# Patient Record
Sex: MALE | Race: WHITE | Employment: UNEMPLOYED | URBAN - METROPOLITAN AREA
[De-identification: names, ages, dates, MRNs, and addresses within clinical notes are randomized per-mention and may not be internally consistent; named-entity substitution may affect disease eponyms.]

---

## 2020-06-08 ENCOUNTER — APPOINTMENT (OUTPATIENT)
Dept: GENERAL RADIOLOGY | Age: 45
End: 2020-06-08
Payer: COMMERCIAL

## 2020-06-08 ENCOUNTER — HOSPITAL ENCOUNTER (EMERGENCY)
Age: 45
Discharge: HOME | End: 2020-06-08
Attending: EMERGENCY MEDICINE
Payer: COMMERCIAL

## 2020-06-08 VITALS
BODY MASS INDEX: 24.92 KG/M2 | HEART RATE: 114 BPM | OXYGEN SATURATION: 99 % | DIASTOLIC BLOOD PRESSURE: 84 MMHG | SYSTOLIC BLOOD PRESSURE: 143 MMHG | TEMPERATURE: 97.5 F | HEIGHT: 71 IN | WEIGHT: 178 LBS | RESPIRATION RATE: 18 BRPM

## 2020-06-08 PROCEDURE — 90471 IMMUNIZATION ADMIN: CPT | Performed by: EMERGENCY MEDICINE

## 2020-06-08 PROCEDURE — 99283 EMERGENCY DEPT VISIT LOW MDM: CPT

## 2020-06-08 PROCEDURE — 6370000000 HC RX 637 (ALT 250 FOR IP): Performed by: EMERGENCY MEDICINE

## 2020-06-08 PROCEDURE — 90715 TDAP VACCINE 7 YRS/> IM: CPT | Performed by: EMERGENCY MEDICINE

## 2020-06-08 PROCEDURE — 73130 X-RAY EXAM OF HAND: CPT

## 2020-06-08 PROCEDURE — 6360000002 HC RX W HCPCS: Performed by: EMERGENCY MEDICINE

## 2020-06-08 RX ORDER — CLINDAMYCIN PHOSPHATE 10 UG/ML
LOTION TOPICAL
COMMUNITY
Start: 2015-05-26

## 2020-06-08 RX ORDER — IBUPROFEN 600 MG/1
600 TABLET ORAL ONCE
Status: COMPLETED | OUTPATIENT
Start: 2020-06-08 | End: 2020-06-08

## 2020-06-08 RX ORDER — IBUPROFEN 600 MG/1
600 TABLET ORAL EVERY 8 HOURS PRN
Qty: 30 TABLET | Refills: 0 | Status: SHIPPED | OUTPATIENT
Start: 2020-06-08

## 2020-06-08 RX ORDER — DIAPER,BRIEF,INFANT-TODD,DISP
EACH MISCELLANEOUS 2 TIMES DAILY
Status: DISCONTINUED | OUTPATIENT
Start: 2020-06-08 | End: 2020-06-08 | Stop reason: HOSPADM

## 2020-06-08 RX ADMIN — TETANUS TOXOID, REDUCED DIPHTHERIA TOXOID AND ACELLULAR PERTUSSIS VACCINE, ADSORBED 0.5 ML: 5; 2.5; 8; 8; 2.5 SUSPENSION INTRAMUSCULAR at 16:31

## 2020-06-08 RX ADMIN — IBUPROFEN 600 MG: 600 TABLET, FILM COATED ORAL at 16:31

## 2020-06-08 ASSESSMENT — ENCOUNTER SYMPTOMS
VOMITING: 0
DIARRHEA: 0
SORE THROAT: 0
FACIAL SWELLING: 0
TROUBLE SWALLOWING: 0
SHORTNESS OF BREATH: 0
WHEEZING: 0
CHEST TIGHTNESS: 0
SINUS PRESSURE: 0
VOICE CHANGE: 0
CONSTIPATION: 0
BLOOD IN STOOL: 0
COUGH: 0
BACK PAIN: 0
EYE REDNESS: 0
ABDOMINAL PAIN: 0
EYE DISCHARGE: 0
CHOKING: 0
EYE PAIN: 0
STRIDOR: 0

## 2020-06-08 ASSESSMENT — PAIN DESCRIPTION - ORIENTATION: ORIENTATION: RIGHT

## 2020-06-08 ASSESSMENT — PAIN DESCRIPTION - FREQUENCY: FREQUENCY: CONTINUOUS

## 2020-06-08 ASSESSMENT — PAIN SCALES - GENERAL
PAINLEVEL_OUTOF10: 6
PAINLEVEL_OUTOF10: 6

## 2020-06-08 ASSESSMENT — PAIN DESCRIPTION - PAIN TYPE: TYPE: ACUTE PAIN

## 2020-06-08 ASSESSMENT — PAIN DESCRIPTION - LOCATION: LOCATION: HAND

## 2020-06-08 ASSESSMENT — PAIN DESCRIPTION - PROGRESSION: CLINICAL_PROGRESSION: GRADUALLY WORSENING

## 2020-06-08 ASSESSMENT — PAIN DESCRIPTION - ONSET: ONSET: ON-GOING

## 2020-06-08 NOTE — ED PROVIDER NOTES
seizures, syncope, weakness, numbness and headaches. Hematological: Negative for adenopathy. Does not bruise/bleed easily. Psychiatric/Behavioral: Negative for agitation, behavioral problems, hallucinations and sleep disturbance. The patient is not hyperactive. All other systems reviewed and are negative. Except as noted above the remainder of the review of systems was reviewed and negative. PAST MEDICAL HISTORY     Past Medical History:   Diagnosis Date    H/O testicular cancer left         SURGICALHISTORY       Past Surgical History:   Procedure Laterality Date    APPENDECTOMY      CYST REMOVAL Left 1970    left wrist    LAPAROSCOPIC APPENDECTOMY  10/21/14    OTHER SURGICAL HISTORY      port removal    TESTICLE REMOVAL Left     TESTICLE SURGERY Right     TONSILLECTOMY      TUNNELED VENOUS PORT PLACEMENT           CURRENT MEDICATIONS       Previous Medications    CLINDAMYCIN (CLEOCIN T) 1 % LOTION    Clindamycin Clindamycin Phosphate 1 % External Lotion APPLY SPARINGLY AND MASSAGE IN TWICE DAILY. Quantity: 1 Refills: 6 Terrence Torres DO Start : 31-Mar-2016 Active 60 ML Bottle 03- [facility] ([id number])    SULFAMETHOXAZOLE-TRIMETHOPRIM (BACTRIM DS) 800-160 MG PER TABLET           ALLERGIES     Bleomycin and Codeine    FAMILY HISTORY     History reviewed. No pertinent family history.        SOCIAL HISTORY       Social History     Socioeconomic History    Marital status: Single     Spouse name: None    Number of children: None    Years of education: None    Highest education level: None   Occupational History    None   Social Needs    Financial resource strain: None    Food insecurity     Worry: None     Inability: None    Transportation needs     Medical: None     Non-medical: None   Tobacco Use    Smoking status: Never Smoker    Smokeless tobacco: Never Used   Substance and Sexual Activity    Alcohol use: Not Currently    Drug use: Never    Sexual activity: None   Lifestyle    Physical activity     Days per week: None     Minutes per session: None    Stress: None   Relationships    Social connections     Talks on phone: None     Gets together: None     Attends Adventism service: None     Active member of club or organization: None     Attends meetings of clubs or organizations: None     Relationship status: None    Intimate partner violence     Fear of current or ex partner: None     Emotionally abused: None     Physically abused: None     Forced sexual activity: None   Other Topics Concern    None   Social History Narrative    None       SCREENINGS      @FLOW([id number])@      PHYSICAL EXAM    (up to 7 for level 4, 8 or more for level 5)     ED Triage Vitals [06/08/20 1549]   BP Temp Temp Source Pulse Resp SpO2 Height Weight   (!) 143/84 97.5 °F (36.4 °C) Temporal 114 18 99 % 5' 11\" (1.803 m) 178 lb (80.7 kg)       Physical Exam  Vitals signs and nursing note reviewed. Constitutional:       Appearance: Normal appearance. He is well-developed and normal weight. Comments: Active alert cooperative patient slightly anxious at this time   HENT:      Head: Normocephalic. Right Ear: Tympanic membrane, ear canal and external ear normal.      Left Ear: Tympanic membrane, ear canal and external ear normal.      Nose: No congestion or rhinorrhea. Mouth/Throat:      Pharynx: No oropharyngeal exudate or posterior oropharyngeal erythema. Eyes:      General:         Right eye: No discharge. Left eye: No discharge. Extraocular Movements: Extraocular movements intact. Neck:      Musculoskeletal: Normal range of motion and neck supple. Cardiovascular:      Rate and Rhythm: Normal rate. Heart sounds: Normal heart sounds. No murmur. No gallop. Pulmonary:      Effort: No respiratory distress. Breath sounds: Normal breath sounds. No wheezing. Abdominal:      General: Bowel sounds are normal.      Palpations: Abdomen is soft. (!) 143/84   Pulse: 114   Resp: 18   Temp: 97.5 °F (36.4 °C)   TempSrc: Temporal   SpO2: 99%   Weight: 178 lb (80.7 kg)   Height: 5' 11\" (1.803 m)           MDM    CRITICAL CARE TIME   Total Critical Care time was  minutes, excluding separately reportableprocedures. There was a high probability of clinicallysignificant/life threatening deterioration in the patient's condition which required my urgent intervention. ONSULTS:  None    PROCEDURES:  Unless otherwise noted below, none     Procedures    FINAL IMPRESSION      1. Contusion of right hand, initial encounter    2. Crush injury          DISPOSITION/PLAN   DISPOSITION        PATIENT REFERRED TO:  No follow-up provider specified.     DISCHARGE MEDICATIONS:  New Prescriptions    No medications on file          (Please note that portions of this note were completed with a voice recognition program.  Efforts were made to edit the dictations but occasionally words are mis-transcribed.)    Valorie Reynolds MD (electronically signed)  Attending Emergency Physician       Valorie Reynolds MD  06/08/20 8448

## 2022-05-25 ENCOUNTER — HOSPITAL ENCOUNTER (EMERGENCY)
Age: 47
Discharge: HOME | End: 2022-05-25
Attending: STUDENT IN AN ORGANIZED HEALTH CARE EDUCATION/TRAINING PROGRAM

## 2022-05-25 ENCOUNTER — APPOINTMENT (OUTPATIENT)
Dept: CT IMAGING | Age: 47
End: 2022-05-25

## 2022-05-25 VITALS
RESPIRATION RATE: 12 BRPM | HEART RATE: 87 BPM | BODY MASS INDEX: 25.8 KG/M2 | SYSTOLIC BLOOD PRESSURE: 143 MMHG | WEIGHT: 185 LBS | TEMPERATURE: 98.7 F | DIASTOLIC BLOOD PRESSURE: 98 MMHG | OXYGEN SATURATION: 96 %

## 2022-05-25 DIAGNOSIS — R20.0 NUMBNESS: ICD-10-CM

## 2022-05-25 DIAGNOSIS — R20.2 PARESTHESIAS: Primary | ICD-10-CM

## 2022-05-25 LAB
ALBUMIN SERPL-MCNC: 4.3 G/DL (ref 3.5–4.6)
ALP BLD-CCNC: 67 U/L (ref 35–104)
ALT SERPL-CCNC: 20 U/L (ref 0–41)
ANION GAP SERPL CALCULATED.3IONS-SCNC: 13 MEQ/L (ref 9–15)
APTT: 27.8 SEC (ref 24.4–36.8)
AST SERPL-CCNC: 19 U/L (ref 0–40)
BASOPHILS ABSOLUTE: 0 K/UL (ref 0–0.2)
BASOPHILS RELATIVE PERCENT: 0.4 %
BILIRUB SERPL-MCNC: <0.2 MG/DL (ref 0.2–0.7)
BUN BLDV-MCNC: 19 MG/DL (ref 6–20)
CALCIUM SERPL-MCNC: 9.3 MG/DL (ref 8.5–9.9)
CHLORIDE BLD-SCNC: 104 MEQ/L (ref 95–107)
CHOLESTEROL, TOTAL: 197 MG/DL (ref 0–199)
CO2: 23 MEQ/L (ref 20–31)
CREAT SERPL-MCNC: 0.9 MG/DL (ref 0.7–1.2)
EKG ATRIAL RATE: 93 BPM
EKG P AXIS: 51 DEGREES
EKG P-R INTERVAL: 172 MS
EKG Q-T INTERVAL: 430 MS
EKG QRS DURATION: 96 MS
EKG QTC CALCULATION (BAZETT): 534 MS
EKG R AXIS: 11 DEGREES
EKG T AXIS: 35 DEGREES
EKG VENTRICULAR RATE: 93 BPM
EOSINOPHILS ABSOLUTE: 0 K/UL (ref 0–0.7)
EOSINOPHILS RELATIVE PERCENT: 0.7 %
GFR AFRICAN AMERICAN: >60
GFR NON-AFRICAN AMERICAN: >60
GLOBULIN: 2.4 G/DL (ref 2.3–3.5)
GLUCOSE BLD-MCNC: 112 MG/DL (ref 70–99)
HCT VFR BLD CALC: 43.4 % (ref 42–52)
HDLC SERPL-MCNC: 42 MG/DL (ref 40–59)
HEMOGLOBIN: 14.7 G/DL (ref 14–18)
INR BLD: 0.9
LDL CHOLESTEROL CALCULATED: 133 MG/DL (ref 0–129)
LYMPHOCYTES ABSOLUTE: 1.5 K/UL (ref 1–4.8)
LYMPHOCYTES RELATIVE PERCENT: 29.1 %
MAGNESIUM: 2.2 MG/DL (ref 1.7–2.4)
MCH RBC QN AUTO: 29.9 PG (ref 27–31.3)
MCHC RBC AUTO-ENTMCNC: 33.8 % (ref 33–37)
MCV RBC AUTO: 88.5 FL (ref 80–100)
MONOCYTES ABSOLUTE: 0.5 K/UL (ref 0.2–0.8)
MONOCYTES RELATIVE PERCENT: 9.4 %
NEUTROPHILS ABSOLUTE: 3.2 K/UL (ref 1.4–6.5)
NEUTROPHILS RELATIVE PERCENT: 60.4 %
PDW BLD-RTO: 13.2 % (ref 11.5–14.5)
PLATELET # BLD: 237 K/UL (ref 130–400)
POC CREATININE WHOLE BLOOD: 0.9
POTASSIUM SERPL-SCNC: 3.9 MEQ/L (ref 3.4–4.9)
PROTHROMBIN TIME: 11.9 SEC (ref 12.3–14.9)
RBC # BLD: 4.9 M/UL (ref 4.7–6.1)
SODIUM BLD-SCNC: 140 MEQ/L (ref 135–144)
TOTAL CK: 147 U/L (ref 0–190)
TOTAL PROTEIN: 6.7 G/DL (ref 6.3–8)
TRIGL SERPL-MCNC: 109 MG/DL (ref 0–150)
TROPONIN: <0.01 NG/ML (ref 0–0.01)
WBC # BLD: 5.3 K/UL (ref 4.8–10.8)

## 2022-05-25 PROCEDURE — 70496 CT ANGIOGRAPHY HEAD: CPT

## 2022-05-25 PROCEDURE — 82550 ASSAY OF CK (CPK): CPT

## 2022-05-25 PROCEDURE — 85610 PROTHROMBIN TIME: CPT

## 2022-05-25 PROCEDURE — 99285 EMERGENCY DEPT VISIT HI MDM: CPT

## 2022-05-25 PROCEDURE — 6360000004 HC RX CONTRAST MEDICATION: Performed by: STUDENT IN AN ORGANIZED HEALTH CARE EDUCATION/TRAINING PROGRAM

## 2022-05-25 PROCEDURE — 83735 ASSAY OF MAGNESIUM: CPT

## 2022-05-25 PROCEDURE — 6360000002 HC RX W HCPCS: Performed by: STUDENT IN AN ORGANIZED HEALTH CARE EDUCATION/TRAINING PROGRAM

## 2022-05-25 PROCEDURE — 2580000003 HC RX 258: Performed by: STUDENT IN AN ORGANIZED HEALTH CARE EDUCATION/TRAINING PROGRAM

## 2022-05-25 PROCEDURE — 70450 CT HEAD/BRAIN W/O DYE: CPT

## 2022-05-25 PROCEDURE — 96365 THER/PROPH/DIAG IV INF INIT: CPT

## 2022-05-25 PROCEDURE — 36415 COLL VENOUS BLD VENIPUNCTURE: CPT

## 2022-05-25 PROCEDURE — 70498 CT ANGIOGRAPHY NECK: CPT

## 2022-05-25 PROCEDURE — 85025 COMPLETE CBC W/AUTO DIFF WBC: CPT

## 2022-05-25 PROCEDURE — 80053 COMPREHEN METABOLIC PANEL: CPT

## 2022-05-25 PROCEDURE — 85730 THROMBOPLASTIN TIME PARTIAL: CPT

## 2022-05-25 PROCEDURE — 80061 LIPID PANEL: CPT

## 2022-05-25 PROCEDURE — 84484 ASSAY OF TROPONIN QUANT: CPT

## 2022-05-25 PROCEDURE — 93005 ELECTROCARDIOGRAM TRACING: CPT | Performed by: STUDENT IN AN ORGANIZED HEALTH CARE EDUCATION/TRAINING PROGRAM

## 2022-05-25 RX ORDER — 0.9 % SODIUM CHLORIDE 0.9 %
1000 INTRAVENOUS SOLUTION INTRAVENOUS ONCE
Status: COMPLETED | OUTPATIENT
Start: 2022-05-25 | End: 2022-05-25

## 2022-05-25 RX ORDER — HYDROXYZINE HYDROCHLORIDE 25 MG/1
50 TABLET, FILM COATED ORAL ONCE
Status: DISCONTINUED | OUTPATIENT
Start: 2022-05-25 | End: 2022-05-25 | Stop reason: HOSPADM

## 2022-05-25 RX ORDER — MAGNESIUM SULFATE IN WATER 40 MG/ML
2000 INJECTION, SOLUTION INTRAVENOUS ONCE
Status: COMPLETED | OUTPATIENT
Start: 2022-05-25 | End: 2022-05-25

## 2022-05-25 RX ADMIN — SODIUM CHLORIDE 1000 ML: 9 INJECTION, SOLUTION INTRAVENOUS at 06:17

## 2022-05-25 RX ADMIN — MAGNESIUM SULFATE HEPTAHYDRATE 2000 MG: 40 INJECTION, SOLUTION INTRAVENOUS at 07:08

## 2022-05-25 RX ADMIN — IOPAMIDOL 100 ML: 612 INJECTION, SOLUTION INTRAVENOUS at 06:52

## 2022-05-25 ASSESSMENT — ENCOUNTER SYMPTOMS
PHOTOPHOBIA: 0
VOMITING: 0
NAUSEA: 0
ABDOMINAL PAIN: 0
SHORTNESS OF BREATH: 0
RHINORRHEA: 0
SORE THROAT: 0
DIARRHEA: 0
BACK PAIN: 0
COUGH: 0
EYE PAIN: 0

## 2022-05-25 NOTE — ED PROVIDER NOTES
[address] [facility] ED  eMERGENCY dEPARTMENT eNCOUnter      Pt Name: Jaky Chirinos  MRN: [id number]  [place] 1975  Date of evaluation: 5/25/2022  Provider: Cherelle Morrison MD      HISTORY OF PRESENT ILLNESS      Chief Complaint   Patient presents with    Numbness     pt c/o numbness on right side of arm, face and foot. for 1 hour. pt states symptoms are going away now       The history is provided by the Patient. Jaky Chirinos is a 55 y.o. male with a PMH clinically significant for GERD,  hiatal hernia and remote hx of testicular CA s/p resection and chemo presenting to the ED via PV c/o numbness and tingling to the right sided face, right forearm and right ankle and foot with initial onset at approximately 12 AM this morning. States he was recently evaluated by his PCP due to areas of swelling, pain and cramping with tingling sensations to the left lower extremity. States pain is mostly over the areas of his varicose veins. States he is going to be having an ultrasound to rule out DVT later today. States that the cramping as been intermittent however and is currently resolved. Has had intermittent tingling over these areas as well. States while he was up last night, had onset of the numbness and tingling in the areas as described above. States they have been fluctuating throughout the morning. As he looked up the symptoms online, was concerned that he might be having a stroke, so came to the ED. Denies any associated weakness, vision changes, slurred speech, facial asymmetry, headache, lightheadedness, dizziness. States symptoms are only refined to the areas as noted above and that the symptoms in his right ankle and right foot are almost completely resolved now. Denies any difficulty walking. Denies any history of CVA, TIA, ACS, arrhythmias, DVT/PE. States he has been eating and drinking normally. No recent illnesses.   States he does have a family history of both MS and CVA, so really wants to be evaluated. Denies any history of HLD, HTN, DM 2, smoking. Per Chart Review: No previous brain imaging appreciated. REVIEW OF SYSTEMS       Review of Systems   Constitutional: Negative for chills and fever. HENT: Positive for dental problem. Negative for rhinorrhea and sore throat. Eyes: Negative for photophobia, pain and visual disturbance. Respiratory: Negative for cough and shortness of breath. Cardiovascular: Negative for chest pain and palpitations. Gastrointestinal: Negative for abdominal pain, diarrhea, nausea and vomiting. Genitourinary: Negative for dysuria. Musculoskeletal: Positive for myalgias. Negative for arthralgias, back pain, gait problem, joint swelling and neck pain. Skin: Negative for rash. Neurological: Positive for numbness. Negative for dizziness, facial asymmetry, weakness, light-headedness and headaches. Psychiatric/Behavioral: The patient is nervous/anxious. PAST MEDICAL HISTORY     Past Medical History:   Diagnosis Date    H/O testicular cancer left       SURGICAL HISTORY       Past Surgical History:   Procedure Laterality Date    APPENDECTOMY      CYST REMOVAL Left 1970    left wrist    LAPAROSCOPIC APPENDECTOMY  10/21/14    OTHER SURGICAL HISTORY      port removal    TESTICLE REMOVAL Left     TESTICLE SURGERY Right     TONSILLECTOMY      TUNNELED VENOUS PORT PLACEMENT         FAMILY HISTORY     No family history on file.     SOCIAL HISTORY       Social History     Socioeconomic History    Marital status: Single     Spouse name: Not on file    Number of children: Not on file    Years of education: Not on file    Highest education level: Not on file   Occupational History    Not on file   Tobacco Use    Smoking status: Never Smoker    Smokeless tobacco: Never Used   Vaping Use    Vaping Use: Never used   Substance and Sexual Activity    Alcohol use: Not Currently    Drug use: Never    Sexual activity: Not on file Other Topics Concern    Not on file   Social History Narrative    Not on file     Social Determinants of Health     Financial Resource Strain:     Difficulty of Paying Living Expenses: Not on file   Food Insecurity:     Worried About Running Out of Food in the Last Year: Not on file    Delia of Food in the Last Year: Not on file   Transportation Needs:     Lack of Transportation (Medical): Not on file    Lack of Transportation (Non-Medical): Not on file   Physical Activity:     Days of Exercise per Week: Not on file    Minutes of Exercise per Session: Not on file   Stress:     Feeling of Stress : Not on file   Social Connections:     Frequency of Communication with Friends and Family: Not on file    Frequency of Social Gatherings with Friends and Family: Not on file    Attends Jainism Services: Not on file    Active Member of [address] [place] [address] or Organizations: Not on file    Attends Club or Organization Meetings: Not on file    Marital Status: Not on file   Intimate Partner Violence:     Fear of Current or Ex-Partner: Not on file    Emotionally Abused: Not on file    Physically Abused: Not on file    Sexually Abused: Not on file   Housing Stability:     Unable to Pay for Housing in the Last Year: Not on file    Number of [place] in the Last Year: Not on file    Unstable Housing in the Last Year: Not on file       CURRENT MEDICATIONS       Previous Medications    CLINDAMYCIN (CLEOCIN T) 1 % LOTION    Clindamycin Clindamycin Phosphate 1 % External Lotion APPLY SPARINGLY AND MASSAGE IN TWICE DAILY.  Quantity: 1 Refills: 6 Terrence Torres DO Start : 31-Mar-2016 Active 60 ML Bottle 03- [facility] 590 ([id number])    IBUPROFEN (IBU) 600 MG TABLET    Take 1 tablet by mouth every 8 hours as needed for Pain    SULFAMETHOXAZOLE-TRIMETHOPRIM (BACTRIM DS) 800-160 MG PER TABLET           ALLERGIES     Bleomycin and Codeine      PHYSICAL EXAM       ED Triage Vitals   BP Temp Temp src Pulse Resp SpO2 Height Weight   -- -- -- -- -- -- -- --       Physical Exam  Vitals and nursing note reviewed. Constitutional:       General: He is not in acute distress. Appearance: He is not ill-appearing, toxic-appearing or diaphoretic. HENT:      Head: Normocephalic and atraumatic. Mouth/Throat:      Mouth: Mucous membranes are moist.      Pharynx: Oropharynx is clear. Eyes:      General: No visual field deficit. Extraocular Movements: Extraocular movements intact. Pupils: Pupils are equal, round, and reactive to light. Visual Fields: Right eye visual fields normal and left eye visual fields normal.   Cardiovascular:      Rate and Rhythm: Normal rate and regular rhythm. Pulses: Normal pulses. Heart sounds: Normal heart sounds. Pulmonary:      Effort: Pulmonary effort is normal.      Breath sounds: Normal breath sounds. Abdominal:      General: There is no distension. Palpations: Abdomen is soft. Tenderness: There is no abdominal tenderness. Musculoskeletal:         General: No tenderness or signs of injury. Cervical back: Normal range of motion and neck supple. Right lower leg: No edema. Left lower leg: No edema. Comments: Varicose veins noted over the left lower leg. Skin:     General: Skin is warm and dry. Capillary Refill: Capillary refill takes less than 2 seconds. Neurological:      Mental Status: He is alert and oriented to person, place, and time. Cranial Nerves: No dysarthria or facial asymmetry. Motor: Motor function is intact. No weakness. Coordination: Coordination is intact. Gait: Gait is intact. Comments: Altered sensorium to light touch over the R-sided face, right hand and volar forearm, right ankle. No altered sensation to light touch over the Right upper arm or thigh. Psychiatric:         Mood and Affect: Mood is anxious.          Behavior: Behavior normal.         MDM:   Chart Reviewed: PMH and additional information as noted in HPI obtained from chart review    Vitals:    Vitals:    05/25/22 0523 05/25/22 0600 05/25/22 0630   BP: (!) 152/107 (!) 158/98 (!) 143/98   Pulse: (!) 105 95 87   Resp: 18 13 12   Temp: 98.7 °F (37.1 °C)     TempSrc: Oral     SpO2: 100% 98% 96%   Weight: 185 lb (83.9 kg)         PROCEDURES:  Unless otherwise noted below, none  Procedures    LABS:  Labs Reviewed   COMPREHENSIVE METABOLIC PANEL - Abnormal; Notable for the following components:       Result Value    Glucose 112 (*)     All other components within normal limits   PROTIME-INR - Abnormal; Notable for the following components:    Protime 11.9 (*)     All other components within normal limits   POCT CREATININE - URINE - Normal   MAGNESIUM   APTT   CBC WITH AUTO DIFFERENTIAL   TROPONIN   CK       CT HEAD WO CONTRAST   Final Result      There is no acute intracranial process. CTA Head W WO Contrast   Final Result   The major intracranial arterial structures are patent without high-grade stenosis, large vessel cut off, or aneurysm. EXAMINATION: CTA HEAD W WO CONTRAST, CTA NECK W WO CONTRAST      DATE AND TIME:5/25/2022 6:51 AM      CLINICAL HISTORY: Stroke symptoms   r/o LVO        COMPARISON: None      TECHNIQUE: Helical CTA of the neck was performed from the aortic arch to the foramen magnum following the uneventful intravenous administration of 100 cc of nonionic contrast without incident. 2-D images were reconstructed in the sagittal and coronal    planes. Three Dimensional Maximum Intensity Projection (3D-MIP) images were created. All images were reviewed and primarily archived to PACS workstation. All CT scans at this facility use dose modulation, iterative reconstruction, and/or weight based    dosing when appropriate to reduce radiation dose to as low as reasonably achievable.  NASCET Criteria were utilized      FINDINGS CTA NECK:      Aortic Arch: The visualized portions of the aortic arch and proximal arch vessels demonstrates no focal stenosis aneurysm or dissection. Carotids: The common carotid arteries, carotid bifurcations, internal and external carotid arteries are normal in course and caliber. Right  Proximal Internal Carotid Stenosis (% by NASCET Criteria): There is no hemodynamically significant stenosis. Left  Proximal Internal Carotid Stenosis (% by NASCET Criteria): There is no hemodynamically significant stenosis. Vertebral Arteries:      Patency: The vertebral arteries are well visualized to the level of the basilar artery. There is no focal stenosis aneurysm or dissection. Vertebral arteries are codominant. IMPRESSION: Negative CTA of the neck. CTA Neck W WO Contrast   Final Result   The major intracranial arterial structures are patent without high-grade stenosis, large vessel cut off, or aneurysm. EXAMINATION: CTA HEAD W WO CONTRAST, CTA NECK W WO CONTRAST      DATE AND TIME:5/25/2022 6:51 AM      CLINICAL HISTORY: Stroke symptoms   r/o LVO        COMPARISON: None      TECHNIQUE: Helical CTA of the neck was performed from the aortic arch to the foramen magnum following the uneventful intravenous administration of 100 cc of nonionic contrast without incident. 2-D images were reconstructed in the sagittal and coronal    planes. Three Dimensional Maximum Intensity Projection (3D-MIP) images were created. All images were reviewed and primarily archived to PACS workstation. All CT scans at this facility use dose modulation, iterative reconstruction, and/or weight based    dosing when appropriate to reduce radiation dose to as low as reasonably achievable. NASCET Criteria were utilized      FINDINGS CTA NECK:      Aortic Arch: The visualized portions of the aortic arch and proximal arch vessels demonstrates no focal stenosis aneurysm or dissection. Carotids:  The common carotid arteries, carotid bifurcations, internal and external carotid arteries are normal in course and caliber. Right  Proximal Internal Carotid Stenosis (% by NASCET Criteria): There is no hemodynamically significant stenosis. Left  Proximal Internal Carotid Stenosis (% by NASCET Criteria): There is no hemodynamically significant stenosis. Vertebral Arteries:      Patency: The vertebral arteries are well visualized to the level of the basilar artery. There is no focal stenosis aneurysm or dissection. Vertebral arteries are codominant. IMPRESSION: Negative CTA of the neck. ED Course as of 05/25/22 0748   Wed May 25, 2022   0631 EKG 12 Lead  EKG showing normal sinus rhythm, rate of 93 bpm.  Normal axis, prolonged QTC at 534. Nonspecific ST-T wave abnormalities. [NA]   0649 INR: 0.9 [NA]   0649 aPTT: 27.8  Coags WNL [NA]   0649 Troponin: <0.010  WNL, lower suspicion for ACS [NA]   0650 Magnesium: 2.2 [NA]   0650 Comprehensive Metabolic Panel(!):    Sodium 140   Potassium 3.9   Chloride 104   CO2 23   Anion Gap 13   GLUCOSE, FASTING,(!)   BUN,BUNPL 19   Creatinine 0.90   GFR Non-African American >60.0   GFR  >60.0   CALCIUM, SERUM, 381561 9.3   Total Protein 6.7   Albumin 4.3   Bilirubin <0.2   Alk Phos 67   ALT 20   AST 19   Globulin 2.4  Unremarkable [NA]      ED Course User Index  [NA] Octavio Patterson MD       55 y.o. male with a PMH clinically significant for GERD,  hiatal hernia and remote hx of testicular CA s/p resection and chemo presenting to the ED via PV c/o numbness and tingling to the right sided face, right forearm and right ankle and foot with initial onset at approximately 12 AM this morning. Upon initial evaluation, Pt very anxious appearing, but otherwise Afebrile, HDS and in NAD. PE as noted above. NIHSS score of 0. Labs, , EKG, and Imaging as noted above.   Given findings, clinical presentation most likely consistent w/ atypical paraesthesias and numbness not clinically meeting single neurovascular distribution in the setting of significant anxiety. We will however obtain CTA of the head and neck to further evaluate for possible CVA. Lower suspicion for ICH. Also with lower suspicion for MS at this time given symptoms only present for the past 24 hours. Thought pain in the left lower extremity most likely secondary to varicose veins. No significant swelling in the left lower extremity as compared to the right. Areas of pain pointed out by the patient to overlie varicose veins. No recent risk factors to increase risk of clotting. Given findings, will obtain further imaging to rule out CVA and plan for reevaluation in the ED. Pt was administered   Medications   hydrOXYzine (ATARAX) tablet 50 mg (50 mg Oral Not Given 5/25/22 0607)   magnesium sulfate 2000 mg in 50 mL IVPB premix (2,000 mg IntraVENous New Bag 5/25/22 0708)   0.9 % sodium chloride bolus (1,000 mLs IntraVENous New Bag 5/25/22 0617)   iopamidol (ISOVUE-300) 61 % injection 100 mL (100 mLs IntraVENous Given 5/25/22 0313)       Plan: Follow up imaging and re-evaluate. Discuss with neurology. Disposition appropriately pending results. Signed out to Dr. Gabriele Mcdonald with plan as noted above. CRITICAL CARE TIME   Total CriticalCare time was 0 minutes, excluding separately reportable procedures. There was a high probability of clinically significant/life threatening deterioration in the patient's condition which required my urgent intervention. FINAL IMPRESSION      1. Paresthesias    2.  Numbness          DISPOSITION/PLAN   DISPOSITION        Current Discharge Medication List           MD Emmy Fabian MD  05/26/22 1986

## 2022-05-25 NOTE — ED TRIAGE NOTES
Pt c/o pain in left leg Monday afternoon then last night, parts of right leg started going numb, then numbness in right arm and right side of face. Pt states that symptoms are going away now. Pt is a/o x 4 calm, skin p/w/d resp even and non-labored. No facial droop, no slurred speech. Hand grasps and foot push/pulls strong and equal bilat. No sob or acute distress noted. Dr Kirk Mayer at bedside during triage.

## 2023-03-02 PROBLEM — R35.0 URINARY FREQUENCY: Status: ACTIVE | Noted: 2023-03-02

## 2023-03-02 PROBLEM — Z04.9 CONDITION NOT FOUND: Status: ACTIVE | Noted: 2023-03-02

## 2023-03-02 PROBLEM — L70.9 ACNE: Status: ACTIVE | Noted: 2023-03-02

## 2023-03-02 PROBLEM — R30.0 BURNING WITH URINATION: Status: ACTIVE | Noted: 2023-03-02

## 2023-03-02 PROBLEM — K44.9 HIATAL HERNIA: Status: ACTIVE | Noted: 2023-03-02

## 2023-03-02 PROBLEM — R09.89 PAINFUL VEINS: Status: ACTIVE | Noted: 2023-03-02

## 2023-03-02 PROBLEM — I80.8 SUPERFICIAL THROMBOPHLEBITIS OF RIGHT UPPER EXTREMITY: Status: ACTIVE | Noted: 2023-03-02

## 2023-03-02 PROBLEM — M79.605 PAIN OF LEFT LOWER EXTREMITY: Status: ACTIVE | Noted: 2023-03-02

## 2023-03-02 PROBLEM — R52 PAINFUL VEINS: Status: ACTIVE | Noted: 2023-03-02

## 2023-03-02 PROBLEM — E66.3 OVERWEIGHT WITH BODY MASS INDEX (BMI) OF 25 TO 25.9 IN ADULT: Status: ACTIVE | Noted: 2023-03-02

## 2023-03-02 PROBLEM — L30.9 DERMATITIS: Status: ACTIVE | Noted: 2023-03-02

## 2023-03-02 PROBLEM — E66.3 OVERWEIGHT WITH BODY MASS INDEX (BMI) OF 26 TO 26.9 IN ADULT: Status: ACTIVE | Noted: 2023-03-02

## 2023-03-02 PROBLEM — L02.91 ABSCESS: Status: ACTIVE | Noted: 2023-03-02

## 2023-03-02 PROBLEM — R10.10 PAIN OF UPPER ABDOMEN: Status: ACTIVE | Noted: 2023-03-02

## 2023-03-02 PROBLEM — R23.3 EASY BRUISING: Status: ACTIVE | Noted: 2023-03-02

## 2023-03-02 PROBLEM — J98.01 BRONCHOSPASM: Status: ACTIVE | Noted: 2023-03-02

## 2023-03-02 PROBLEM — R10.31 ABDOMINAL PAIN, ACUTE, RIGHT LOWER QUADRANT: Status: ACTIVE | Noted: 2023-03-02

## 2023-03-02 PROBLEM — K21.9 GERD (GASTROESOPHAGEAL REFLUX DISEASE): Status: ACTIVE | Noted: 2023-03-02

## 2023-03-02 RX ORDER — CLINDAMYCIN PHOSPHATE 10 UG/ML
LOTION TOPICAL
COMMUNITY
Start: 2016-03-31 | End: 2023-03-15 | Stop reason: ALTCHOICE

## 2023-03-14 NOTE — PROGRESS NOTES
"Subjective   Patient ID: Thomas Lipscomb is a 47 y.o. male who presents to discuss Adderall.    HPI :    Patient c/o's of attention deficits.  Patient is interested in Adderall in order to treat sx.    Pt reports that he was diagnoses with ADD (no hyperactivity) as a child but was never treated as his parents did not want to treat with medication.    He now finds it difficult as he is applying for jobs.  His lack of attention / focus hinders his ability to work effectively and even interview well.    Review of Systems: Constitutional: Patient denies any fever, chills, loss of appetite, or unexplained weight loss.  Cardiovascular: Patient denies any chest pain, shortness of breath with exertion, tachycardia, palpitations, orthopnea, or paroxysmal nocturnal dyspnea.  Respiratory: Patient denies any cough, shortness breath, or wheezing.  Gastrointestinal patient denies any nausea, vomiting, diarrhea, constipation, melena, hematochezia, or reflux symptoms.   Skin: Denies any rashes or skin lesions     Psychiatric: Patient complains of attention deficits, as noted in HPI  Denies any anxiety or depression symptoms.    Objective   /74 (BP Location: Left arm, Patient Position: Sitting, BP Cuff Size: Adult)   Pulse 86   Temp 36.9 °C (98.4 °F) (Temporal)   Resp 16   Ht 1.803 m (5' 11\")   Wt 79.4 kg (175 lb)   SpO2 97%   BMI 24.41 kg/m²       Physical Exam: General Appearance: Alert and cooperative, in no acute distress, well-developed/well-nourished healthy weight male.    Neck: Supple and without adenopathy or rigidity. There is no JVD at 90° and no carotid bruits are noted. There is no thyromegaly, thyroid tenderness, or palpable thyroid nodules.  Heart: Regular rate and rhythm without murmur or ectopy.  Lungs: Clear to auscultation bilaterally with good air exchange.  Skin: Good turgor, moist, warm and without rashes or lesions.  Neurological exam: Alert and oriented x3, no tremor, normal gait.  Extremities: " No clubbing, cyanosis, or edema.   Psychiatric: Appropriate mood and affect, good insight and judgment, no delusions or thought disorders, no suicidal or homicidal ideation      Assessment/Plan       ADD:   3/15/2023: We will refer him to Dr. Hurst for further evaluation and treatment.       Scribe Attestation  By signing my name below, IKelvin , Scrm[place]ha   attest that this documentation has been prepared under the direction and in the presence of Dr. Meadows.

## 2023-03-15 ENCOUNTER — OFFICE VISIT (OUTPATIENT)
Dept: PRIMARY CARE | Facility: CLINIC | Age: 48
End: 2023-03-15

## 2023-03-15 VITALS
HEIGHT: 71 IN | WEIGHT: 175 LBS | RESPIRATION RATE: 16 BRPM | HEART RATE: 86 BPM | BODY MASS INDEX: 24.5 KG/M2 | TEMPERATURE: 98.4 F | SYSTOLIC BLOOD PRESSURE: 124 MMHG | DIASTOLIC BLOOD PRESSURE: 74 MMHG | OXYGEN SATURATION: 97 %

## 2023-03-15 DIAGNOSIS — R41.840 ATTENTION DEFICIT: Primary | ICD-10-CM

## 2023-03-15 PROCEDURE — 1036F TOBACCO NON-USER: CPT | Performed by: FAMILY MEDICINE

## 2023-03-15 PROCEDURE — 99212 OFFICE O/P EST SF 10 MIN: CPT | Performed by: FAMILY MEDICINE

## 2023-03-15 ASSESSMENT — PAIN SCALES - GENERAL: PAINLEVEL: 0-NO PAIN

## 2023-03-15 ASSESSMENT — PATIENT HEALTH QUESTIONNAIRE - PHQ9
2. FEELING DOWN, DEPRESSED OR HOPELESS: NOT AT ALL
1. LITTLE INTEREST OR PLEASURE IN DOING THINGS: NOT AT ALL
SUM OF ALL RESPONSES TO PHQ9 QUESTIONS 1 AND 2: 0

## 2023-03-15 NOTE — PATIENT INSTRUCTIONS
We have referred you to a specialist for the ADD.    It was a pleasure to see you today. Thank you for choosing us for your health care needs.    If you have lab or other testing completed and have not been informed of results within one week, please call the office for your results.    If you haven't done so, consider signing up for [facility], the [facility] personal health record. Ask the staff how you can get started.

## 2023-10-25 DIAGNOSIS — L30.9 DERMATITIS: Primary | ICD-10-CM

## 2023-10-25 RX ORDER — CLINDAMYCIN PHOSPHATE 10 UG/ML
LOTION TOPICAL
COMMUNITY
Start: 2023-04-28 | End: 2023-10-25 | Stop reason: SDUPTHER

## 2023-10-25 RX ORDER — CLINDAMYCIN PHOSPHATE 10 UG/ML
LOTION TOPICAL
Qty: 60 ML | Refills: 1 | Status: SHIPPED | OUTPATIENT
Start: 2023-10-25 | End: 2024-05-06 | Stop reason: SDUPTHER

## 2024-05-06 ENCOUNTER — TELEPHONE (OUTPATIENT)
Dept: PRIMARY CARE | Facility: CLINIC | Age: 49
End: 2024-05-06

## 2024-05-06 DIAGNOSIS — L30.9 DERMATITIS: ICD-10-CM

## 2024-05-06 RX ORDER — CLINDAMYCIN PHOSPHATE 10 UG/ML
LOTION TOPICAL
Qty: 60 ML | Refills: 0 | Status: SHIPPED | OUTPATIENT
Start: 2024-05-06

## 2024-05-06 NOTE — TELEPHONE ENCOUNTER
Patient phones the office today w/ request to refill his Clindamycin 1% lotion to be sent to [facility] on [address].     Thank you.

## 2024-10-30 DIAGNOSIS — L30.9 DERMATITIS: ICD-10-CM

## 2024-10-30 RX ORDER — CLINDAMYCIN PHOSPHATE 10 UG/ML
LOTION TOPICAL
Qty: 60 ML | Refills: 0 | Status: SHIPPED | OUTPATIENT
Start: 2024-10-30

## 2025-05-16 DIAGNOSIS — L30.9 DERMATITIS: ICD-10-CM

## 2025-05-17 RX ORDER — CLINDAMYCIN PHOSPHATE 10 UG/ML
LOTION TOPICAL
Qty: 60 ML | Refills: 0 | Status: SHIPPED | OUTPATIENT
Start: 2025-05-17